# Patient Record
Sex: MALE | Race: WHITE | HISPANIC OR LATINO | Employment: FULL TIME | URBAN - METROPOLITAN AREA
[De-identification: names, ages, dates, MRNs, and addresses within clinical notes are randomized per-mention and may not be internally consistent; named-entity substitution may affect disease eponyms.]

---

## 2023-11-15 ENCOUNTER — OFFICE VISIT (OUTPATIENT)
Dept: ORTHOPEDIC SURGERY | Facility: CLINIC | Age: 38
End: 2023-11-15
Payer: OTHER MISCELLANEOUS

## 2023-11-15 VITALS
HEIGHT: 69 IN | SYSTOLIC BLOOD PRESSURE: 120 MMHG | DIASTOLIC BLOOD PRESSURE: 84 MMHG | BODY MASS INDEX: 23.55 KG/M2 | WEIGHT: 159 LBS

## 2023-11-15 DIAGNOSIS — S62.646A CLOSED NONDISPLACED FRACTURE OF PROXIMAL PHALANX OF RIGHT LITTLE FINGER, INITIAL ENCOUNTER: ICD-10-CM

## 2023-11-15 DIAGNOSIS — M79.641 RIGHT HAND PAIN: Primary | ICD-10-CM

## 2023-11-15 RX ORDER — FLUTICASONE PROPIONATE 50 MCG
2 SPRAY, SUSPENSION (ML) NASAL DAILY
COMMUNITY

## 2023-11-15 RX ORDER — AMOXICILLIN 250 MG/1
250 CAPSULE ORAL 3 TIMES DAILY
COMMUNITY

## 2023-11-15 NOTE — PROGRESS NOTES
"                                                               [facility]opedic     Office Visit       Date: 11/15/2023   Patient Name: Campos Bowie  MRN: [id number]  YOB: 1985    Referring Physician: Ralph Posey MD     Chief Complaint:   Chief Complaint   Patient presents with    Right Hand - Pain     pinky       History of Present Illness:   Campos Bowie is a 38 y.o. male right-hand-dominant presents with right small finger pain after getting his hand crushed at work on 11/6/2023.  Patient works as a  and suffered an injury on the job.  He reports immediate pain and swelling to the right small finger as well as difficulty moving it.  He has been splinting with an AlumaFoam splint since the injury.  He is otherwise healthy.  He reports smoking approximately a pack of cigarettes a day.      Subjective   Review of Systems:   Review of Systems     Pertinent review of systems per HPI.     I reviewed the patient's chief complaint, history of present illness, review of systems, past medical history, surgical history, family history, social history, medications and allergy list in the EMR on 11/15/2023 and agree with the findings above.    Objective    Vital Signs:   Vitals:    11/15/23 0744   BP: 120/84   Weight: 72.1 kg (159 lb)   Height: 175.3 cm (69\")     BMI: BMI is within normal parameters. No other follow-up for BMI required.       General Appearance: No acute distress. Alert and oriented.     Chest:  Non-labored breathing on room air. Regular rate and rhythm.    Right upper Extremity Exam:    No palpable masses or visible lesions  Fingers are warm, well-perfused with appropriate capillary refill.  Palpable radial pulse.    Sensation intact to light touch in median, radial and ulnar nerve distributions.    Motor- Fires FPL, ulnar intrinsics, EPL/EDC w/ full active and passive range of motion. Strength intact.    Non-tender except for in the " areas highlighted below    Tender to palpation over the right small finger PIP and proximal phalanx.  No shortening scissoring or angulation of the digit.  Patient with limited flexion extension secondary to pain.    Imaging/Studies:   Imaging Results (Last 24 Hours)       Procedure Component Value Units Date/Time    XR Hand 3+ View Right [[id number]] Resulted: 11/15/23 0812     Updated: 11/15/23 0827    Narrative:      Right Hand X-Ray    Indication: Pain    Views:  AP, Lateral, and Oblique     Comparison:  None    Findings:  Since you the base of the right small finger proximal phalanx with no   displacement or intra-articular involvement.  Normal soft tissues  Normal joint spaces    Impression:   Nondisplaced extra-articular transverse fracture of the right small finger   proximal phalanx base              Procedures:  Procedures    Quality Measures:   ACP:   ACP discussion was declined by the patient. Patient does not have an advance directive, declines further assistance.    Tobacco:   Campos Bowie  reports that he has been smoking cigarettes. He has a 4.00 pack-year smoking history. He has never used smokeless tobacco..        Assessment / Plan    Assessment/Plan:     Diagnoses and all orders for this visit:    Right hand pain  -     XR Hand 3+ View Right    Closed nondisplaced fracture of proximal phalanx of right little finger, initial encounter    Other orders  -     fluticasone (FLONASE) 50 MCG/ACT nasal spray; 2 sprays into the nostril(s) as directed by provider Daily.  -     amoxicillin (AMOXIL) 250 MG capsule; Take 1 capsule by mouth 3 (Three) Times a Day.         Campos Bowieis a 38 y.o. male who presents with:      ICD-10-CM ICD-9-CM   1. Right hand pain  M79.641 729.5   2. Closed nondisplaced fracture of proximal phalanx of right little finger, initial encounter  S62.646A 816.01         Patient presents with a nondisplaced right small finger proximal phalanx fracture sustained  at work.  Recommend patient karla tape right small and ring fingers to allow early active motion of the nondisplaced fracture.  Recommend nonweightbearing right hand for 4 weeks while it heals.  Patient cannot work for 4 weeks while the fracture heals.  Recommend patient follow-up in 4 weeks for repeat check and range of motion check.    Follow Up:   Return in about 4 weeks (around 12/13/2023).        Randy Bryan MD  [facility]